# Patient Record
Sex: FEMALE
[De-identification: names, ages, dates, MRNs, and addresses within clinical notes are randomized per-mention and may not be internally consistent; named-entity substitution may affect disease eponyms.]

---

## 2017-11-18 ENCOUNTER — HOSPITAL ENCOUNTER (EMERGENCY)
Dept: HOSPITAL 42 - ED | Age: 22
Discharge: HOME | End: 2017-11-18
Payer: MEDICAID

## 2017-11-18 VITALS — BODY MASS INDEX: 22.6 KG/M2

## 2017-11-18 VITALS
SYSTOLIC BLOOD PRESSURE: 120 MMHG | DIASTOLIC BLOOD PRESSURE: 80 MMHG | RESPIRATION RATE: 16 BRPM | HEART RATE: 71 BPM | TEMPERATURE: 98.6 F | OXYGEN SATURATION: 100 %

## 2017-11-18 DIAGNOSIS — N39.0: Primary | ICD-10-CM

## 2017-11-18 LAB
APPEARANCE UR: (no result)
BACTERIA #/AREA URNS HPF: (no result) /[HPF]
BILIRUB UR-MCNC: NEGATIVE MG/DL
COLOR UR: YELLOW
GLUCOSE UR STRIP-MCNC: NEGATIVE MG/DL
KETONES UR STRIP-MCNC: NEGATIVE MG/DL
LEUKOCYTE ESTERASE UR-ACNC: (no result) LEU/UL
PH UR STRIP: 7 [PH] (ref 4.7–8)
PROT UR STRIP-MCNC: 100 MG/DL
RBC # UR STRIP: (no result) /UL
SP GR UR STRIP: 1.02 (ref 1–1.03)
UROBILINOGEN UR STRIP-ACNC: 2 E.U./DL
WBC #/AREA URNS HPF: (no result) /HPF (ref 0–6)

## 2017-11-18 NOTE — ED PDOC
Arrival/HPI





- General


Chief Complaint: Female Genitourinary


Time Seen by Provider: 11/18/17 16:35


Historian: Patient





- History of Present Illness


Narrative History of Present Illness (Text): 





11/18/17 16:45


23yo female with no PMHx who present with 2weeks history of vaginal irritation. 

Notes that the irritation improved after using Monistat for one week. States 

irritation is usually with urination. She however denies urinary frequency, 

dysuria, abdominal pain, vaginal discharge, fever, chills, any other complaint.





Past Medical History





- Provider Review


Nursing Documentation Reviewed: Yes





- Past History


Past History: No Previous





- Psychiatric


Hx Substance Use: No





- Past Surgical History


Past Surgical History: No Previous





Family/Social History





- Physician Review


Nursing Documentation Reviewed: Yes


Family/Social History: Unknown Family HX


Smoking Status: Never Smoked


Hx Alcohol Use: No


Hx Substance Use: No





Allergies/Home Meds


Allergies/Adverse Reactions: 


Allergies





No Known Allergies Allergy (Verified 11/18/17 16:35)


 











Review of Systems





- Physician Review


All systems were reviewed & negative as marked: Yes





- Review of Systems


Constitutional: Normal


Eyes: Normal


ENT: Normal


Respiratory: Normal


Cardiovascular: Normal


Gastrointestinal: Normal


Genitourinary Female: Other (Vaginal irritation)


Musculoskeletal: Normal


Skin: Normal


Neurological: Normal


Endocrine: Normal


Hemo/Lymphatic: Normal


Psychiatric: Normal





Physical Exam


Vital Signs Reviewed: Yes


Vital Signs











  Temp Pulse Resp BP Pulse Ox


 


 11/18/17 16:31  98.6 F  71  16  120/80  100











Temperature: Afebrile


Blood Pressure: Normal


Pulse: Regular


Respiratory Rate: Normal


Appearance: Positive for: Well-Appearing, Non-Toxic, Comfortable


Pain Distress: None


Mental Status: Positive for: Alert and Oriented X 3





- Systems Exam


Head: Present: Atraumatic, Normocephalic


Pupils: Present: PERRL


Extroacular Muscles: Present: EOMI


Conjunctiva: Present: Normal


Mouth: Present: Moist Mucous Membranes


Neck: Present: Normal Range of Motion


Respiratory/Chest: Present: Clear to Auscultation, Good Air Exchange.  No: 

Respiratory Distress, Accessory Muscle Use


Cardiovascular: Present: Regular Rate and Rhythm, Normal S1, S2.  No: Murmurs


Abdomen: Present: Normal Bowel Sounds.  No: Tenderness, Distention, Peritoneal 

Signs, Rebound, Guarding, McBurney's Point Tender, Rovsing's Sign Present


Back: Present: Normal Inspection.  No: CVA Tenderness


Upper Extremity: Present: Normal Inspection.  No: Cyanosis, Edema


Lower Extremity: Present: Normal Inspection.  No: Edema


Neurological: Present: GCS=15, CN II-XII Intact, Speech Normal


Skin: Present: Warm, Dry, Normal Color.  No: Rashes


Psychiatric: Present: Alert, Oriented x 3, Normal Insight, Normal Concentration





Medical Decision Making


ED Course and Treatment: 





11/18/17 18:01


PT was febrile and comfortable in ED. She was treated with Keflex for UTI. 

Referred to her PMD. TRT ED for any new or worsening symptoms.





- Lab Interpretations


Lab Results: 


 Lab Results





11/18/17 17:30: Urine Color Yellow, Urine Appearance Sl cloudy, Urine pH 7.0, 

Ur Specific Gravity 1.020, Urine Protein 100 H, Urine Glucose (UA) Negative, 

Urine Ketones Negative, Urine Blood Small H, Urine Nitrate Negative, Urine 

Bilirubin Negative, Urine Urobilinogen 2.0 H, Ur Leukocyte Esterase Large H, 

Urine RBC Pending, Urine WBC Pending











- Medication Orders


Current Medication Orders: 








Cephalexin Monohydrate (Keflex)  500 mg PO STAT STA


   PRN Reason: Protocol


   Stop: 11/18/17 17:58











Disposition/Present on Arrival





- Present on Arrival


Any Indicators Present on Arrival: No


History of DVT/PE: No


History of Uncontrolled Diabetes: No


Urinary Catheter: No


History of Decub. Ulcer: No


History Surgical Site Infection Following: None





- Disposition


Have Diagnosis and Disposition been Completed?: Yes


Diagnosis: 


 UTI (urinary tract infection)





Disposition: HOME/ ROUTINE


Disposition Time: 18:00


Patient Plan: Discharge


Condition: STABLE


Discharge Instructions (ExitCare):  Urinary Tract Infection in Women (ED)


Additional Instructions: 


Follow up with your doctor


Drink plenty of fluid and take cranberry supplement


Return to ED for any new or worsening symptoms


Prescriptions: 


Cephalexin [cephalexin] 500 mg PO QID #28 cap


Forms:  [employer] (English)

## 2018-02-21 ENCOUNTER — HOSPITAL ENCOUNTER (EMERGENCY)
Dept: HOSPITAL 42 - ED | Age: 23
Discharge: HOME | End: 2018-02-21
Payer: MEDICAID

## 2018-02-21 VITALS — BODY MASS INDEX: 22.6 KG/M2

## 2018-02-21 VITALS — DIASTOLIC BLOOD PRESSURE: 76 MMHG | HEART RATE: 77 BPM | OXYGEN SATURATION: 98 % | SYSTOLIC BLOOD PRESSURE: 118 MMHG

## 2018-02-21 VITALS — TEMPERATURE: 98.5 F | RESPIRATION RATE: 16 BRPM

## 2018-02-21 DIAGNOSIS — N39.0: Primary | ICD-10-CM

## 2018-02-21 LAB
APPEARANCE UR: (no result)
BACTERIA #/AREA URNS HPF: (no result) /[HPF]
BILIRUB UR-MCNC: NEGATIVE MG/DL
COLOR UR: YELLOW
GLUCOSE UR STRIP-MCNC: NEGATIVE MG/DL
LEUKOCYTE ESTERASE UR-ACNC: (no result) LEU/UL
PH UR STRIP: 6.5 [PH] (ref 4.7–8)
PROT UR STRIP-MCNC: (no result) MG/DL
RBC # UR STRIP: (no result) /UL
RBC #/AREA URNS HPF: (no result) /HPF (ref 0–2)
SP GR UR STRIP: 1.02 (ref 1–1.03)
URINE NITRATE: NEGATIVE
UROBILINOGEN UR STRIP-ACNC: 1 E.U./DL

## 2018-02-21 NOTE — ED PDOC
Arrival/HPI





- General


Chief Complaint: Female Genitourinary


Time Seen by Provider: 02/21/18 18:12


Historian: Patient





- History of Present Illness


Narrative History of Present Illness (Text): 





02/21/18 19:06


23yo female with no PMHx who present with complaint of urinary frequency and 

pressure  with urination since today. states she had mild symptom from Sunday, 

but it became persistent today. Reprot previous history of UTI. Denies fever, 

back pain, nausea, vomiting, any other complaint.





Past Medical History





- Provider Review


Nursing Documentation Reviewed: Yes





- Past History


Past History: No Previous





- Cardiac


Hx Cardiac Disorders: No





- Pulmonary


Hx Respiratory Disorders: No





- Neurological


Hx Neurological Disorder: No





- HEENT


Hx HEENT Disorder: No





- Renal


Hx Renal Disorder: No





- Endocrine/Metabolic


Hx Endocrine Disorders: No





- Hematological/Oncological


Hx Blood Disorders: No





- Integumentary


Hx Dermatological Disorder: No





- Musculoskeletal/Rheumatological


Hx Musculoskeletal Disorders: No





- Gastrointestinal


Hx Gastrointestinal Disorders: No





- Genitourinary/Gynecological


Hx Genitourinary Disorders: Yes


Hx Urinary Tract Infection: Yes





- Psychiatric


Hx Psychophysiologic Disorder: No


Hx Substance Use: No





- Past Surgical History


Past Surgical History: No Previous





Family/Social History





- Physician Review


Nursing Documentation Reviewed: Yes


Family/Social History: Unknown Family HX


Smoking Status: Never Smoked


Hx Alcohol Use: No


Hx Substance Use: No





Allergies/Home Meds


Allergies/Adverse Reactions: 


Allergies





No Known Allergies Allergy (Verified 02/21/18 18:06)


 








Home Medications: 


 Home Meds











 Medication  Instructions  Recorded  Confirmed


 


Levonorgestrel-Ethin Estradiol 1 tab PO DAILY 02/21/18 02/21/18





[[id number] Tablet]   














Review of Systems





- Physician Review


All systems were reviewed & negative as marked: Yes





- Review of Systems


Constitutional: Normal


Eyes: Normal


ENT: Normal


Respiratory: Normal


Cardiovascular: Normal


Gastrointestinal: Normal


Genitourinary Female: Dysuria, Frequency


Musculoskeletal: Normal


Skin: Normal


Neurological: Normal


Endocrine: Normal


Hemo/Lymphatic: Normal


Psychiatric: Normal





Physical Exam


Vital Signs Reviewed: Yes





Vital Signs











  Temp Pulse Resp BP Pulse Ox


 


 02/21/18 18:10  98.5 F  74  16  124/84  100











Temperature: Afebrile


Blood Pressure: Normal


Pulse: Regular


Respiratory Rate: Normal


Appearance: Positive for: Well-Appearing, Non-Toxic, Comfortable


Pain Distress: None


Mental Status: Positive for: Alert and Oriented X 3





- Systems Exam


Head: Present: Atraumatic, Normocephalic


Pupils: Present: PERRL


Extroacular Muscles: Present: EOMI


Conjunctiva: Present: Normal


Mouth: Present: Moist Mucous Membranes


Neck: Present: Normal Range of Motion


Respiratory/Chest: Present: Clear to Auscultation, Good Air Exchange.  No: 

Respiratory Distress, Accessory Muscle Use


Cardiovascular: Present: Regular Rate and Rhythm, Normal S1, S2.  No: Murmurs


Abdomen: Present: Normal Bowel Sounds.  No: Tenderness, Distention, Peritoneal 

Signs, Rebound, Guarding, McBurney's Point Tender, Rovsing's Sign Present


Back: Present: Normal Inspection.  No: CVA Tenderness


Upper Extremity: Present: Normal Inspection.  No: Cyanosis, Edema


Lower Extremity: Present: Normal Inspection.  No: Edema


Neurological: Present: GCS=15, CN II-XII Intact, Speech Normal


Skin: Present: Warm, Dry, Normal Color.  No: Rashes


Psychiatric: Present: Alert, Oriented x 3, Normal Insight, Normal Concentration





Medical Decision Making





- Lab Interpretations


Lab Results: 





 Lab Results





02/21/18 18:25: Urine Color Yellow, Urine Appearance Turbid, Urine pH 6.5, Ur 

Specific Gravity 1.020, Urine Protein Trace H, Urine Glucose (UA) Negative, 

Urine Ketones Trace H, Urine Blood Trace-intact H, Urine Nitrate Negative, 

Urine Bilirubin Negative, Urine Urobilinogen 1.0 H, Ur Leukocyte Esterase Small 

H, Urine RBC 0 - 2, Urine WBC 10 - 15, Ur Epithelial Cells 4 - 5, Urine 

Bacteria Many











- Medication Orders


Current Medication Orders: 














Discontinued Medications





Nitrofurantoin Macrocrystals (Macrobid)  100 mg PO Q12 STA


   Stop: 02/21/18 19:01











Disposition/Present on Arrival





- Present on Arrival


Any Indicators Present on Arrival: No


History of DVT/PE: No


History of Uncontrolled Diabetes: No


Urinary Catheter: No


History of Decub. Ulcer: No


History Surgical Site Infection Following: None





- Disposition


Have Diagnosis and Disposition been Completed?: Yes


Diagnosis: 


 UTI (urinary tract infection)





Disposition: HOME/ ROUTINE


Disposition Time: 19:10


Patient Plan: Discharge


Condition: STABLE


Discharge Instructions (ExitCare):  Urinary Tract Infections in Adults


Additional Instructions: 


Take medication as directed


Drink plenty of fluid and rest


Return to ED for any new symptoms


Prescriptions: 


Nitrofurantoin Macrocrystals [Macrobid] 100 mg PO BID #14 cap


Referrals: 


Loan Membreno MD [Primary Care Provider] - Follow up with primary